# Patient Record
(demographics unavailable — no encounter records)

---

## 2024-11-22 NOTE — DISCUSSION/SUMMARY
[de-identified] : 80y Female presents with bilateral knee pain ongoing for several years.  Her right knee has been increasingly painful over the last 20 years and her left knee just in the last few. XR imaging reveals moderate patellar femoral osteoarthritis and DJD.  She tried PT 6 months ago with minimal benefit and is taking NSAIDs with increasing frequency.   Plan: - US guided CSI bilateral - Referral to PT - RTC 1 month for re-evaluation.   Will consider HA injection at that time.  HA injection ordered.

## 2024-11-22 NOTE — PROCEDURE
[de-identified] : Ultrasound Guided Injection   Indication: Ensure placement within the intra-articular knee joint utilizing the Sonosite portable ultrasound machine, the Linear 25mm 15-4 MHz transducer, sterile ultrasound gel, ultrasound guidance with the probe in short axis to the joint , utilizing an in-plane approach, was used for the following injection:    Injection: RIGHT intra-articular knee joint.  Indication: Knee osteoarthritis.  A discussion was had with the patient regarding this procedure and all questions were answered. All risks, benefits and alternatives were discussed. These included but were not limited to bleeding, infection, and allergic reaction. A timeout was performed prior to the procedure to ensure proper side.  Chlorhexidine was used to sterilize the skin overlying the knee joint.  A 21-gauge needle was used to inject 1cc of 0.5% Ropivacaine, 1cc 1% Lidocaine, 1cc of 40mg/mL Depo-Medrol into the joint from a superolateral approach. A sterile bandage was then applied. The patient tolerated the procedure well and there were no complications.  Ultrasound Guided Injection   Indication: Ensure placement within the intra-articular knee joint utilizing the Sonosite portable ultrasound machine, the Linear 25mm 15-4 MHz transducer, sterile ultrasound gel, ultrasound guidance with the probe in short axis to the joint , utilizing an in-plane approach, was used for the following injection:    Injection: LEFT intra-articular knee joint.  Indication: Knee osteoarthritis.  A discussion was had with the patient regarding this procedure and all questions were answered. All risks, benefits and alternatives were discussed. These included but were not limited to bleeding, infection, and allergic reaction. A timeout was performed prior to the procedure to ensure proper side.  Chlorhexidine was used to sterilize the skin overlying the knee joint.  A 21-gauge needle was used to inject 1cc of 0.5% Ropivacaine, 1cc 1% Lidocaine, 1cc of 40mg/mL Depo-Medrol into the joint from a superolateral approach. A sterile bandage was then applied. The patient tolerated the procedure well and there were no complications.

## 2024-11-22 NOTE — HISTORY OF PRESENT ILLNESS
[de-identified] : 80y Female presents with bilateral hip and knee pain for many years. Patient reports her right knee has been bothering her since age 38 when she used it to move a bed (pushed the bed with her knee).  Patient reports 5 years ago she tripped and fell transferring trains and hurt her left knee.  Her knee recovered from the fall but 2 years ago the pain in her left knee started. She has difficulty using stairs and usually crawls.  She crawled today [place] the subway steps.  She is taking OTC Tylenol and Advil on her worst days - last week she took it twice a day daily. She only takes them at night.  Patient states her hips hurt when she walks often, with cold weather, and when doing chores. She states her hips are more stiff than painful.  Patient has tried PT in the past  - on hormonal therapy w/ anastrazole, small molecule inhibitor with Ibrance  - She appears to have excellent functional status and notably while Ibrance can be a/w cytopenias, she has no evidence of marrow suppression on cbc and has been on her off week of her cycle  - Oncologist Dr. Juvencio Fletcher consulted, recommend resuming anastrazole, continue to hold Ibrance

## 2024-11-22 NOTE — PROCEDURE
[de-identified] : Ultrasound Guided Injection   Indication: Ensure placement within the intra-articular knee joint utilizing the Sonosite portable ultrasound machine, the Linear 25mm 15-4 MHz transducer, sterile ultrasound gel, ultrasound guidance with the probe in short axis to the joint , utilizing an in-plane approach, was used for the following injection:    Injection: RIGHT intra-articular knee joint.  Indication: Knee osteoarthritis.  A discussion was had with the patient regarding this procedure and all questions were answered. All risks, benefits and alternatives were discussed. These included but were not limited to bleeding, infection, and allergic reaction. A timeout was performed prior to the procedure to ensure proper side.  Chlorhexidine was used to sterilize the skin overlying the knee joint.  A 21-gauge needle was used to inject 1cc of 0.5% Ropivacaine, 1cc 1% Lidocaine, 1cc of 40mg/mL Depo-Medrol into the joint from a superolateral approach. A sterile bandage was then applied. The patient tolerated the procedure well and there were no complications.  Ultrasound Guided Injection   Indication: Ensure placement within the intra-articular knee joint utilizing the Sonosite portable ultrasound machine, the Linear 25mm 15-4 MHz transducer, sterile ultrasound gel, ultrasound guidance with the probe in short axis to the joint , utilizing an in-plane approach, was used for the following injection:    Injection: LEFT intra-articular knee joint.  Indication: Knee osteoarthritis.  A discussion was had with the patient regarding this procedure and all questions were answered. All risks, benefits and alternatives were discussed. These included but were not limited to bleeding, infection, and allergic reaction. A timeout was performed prior to the procedure to ensure proper side.  Chlorhexidine was used to sterilize the skin overlying the knee joint.  A 21-gauge needle was used to inject 1cc of 0.5% Ropivacaine, 1cc 1% Lidocaine, 1cc of 40mg/mL Depo-Medrol into the joint from a superolateral approach. A sterile bandage was then applied. The patient tolerated the procedure well and there were no complications.

## 2024-11-22 NOTE — HISTORY OF PRESENT ILLNESS
[de-identified] : 80y Female presents with bilateral hip and knee pain for many years. Patient reports her right knee has been bothering her since age 38 when she used it to move a bed (pushed the bed with her knee).  Patient reports 5 years ago she tripped and fell transferring trains and hurt her left knee.  Her knee recovered from the fall but 2 years ago the pain in her left knee started. She has difficulty using stairs and usually crawls.  She crawled today [place] the subway steps.  She is taking OTC Tylenol and Advil on her worst days - last week she took it twice a day daily. She only takes them at night.  Patient states her hips hurt when she walks often, with cold weather, and when doing chores. She states her hips are more stiff than painful.  Patient has tried PT in the past

## 2024-11-22 NOTE — DISCUSSION/SUMMARY
[de-identified] : 80y Female presents with bilateral knee pain ongoing for several years.  Her right knee has been increasingly painful over the last 20 years and her left knee just in the last few. XR imaging reveals moderate patellar femoral osteoarthritis and DJD.  She tried PT 6 months ago with minimal benefit and is taking NSAIDs with increasing frequency.   Plan: - US guided CSI bilateral - Referral to PT - RTC 1 month for re-evaluation.   Will consider HA injection at that time.  HA injection ordered.

## 2024-11-22 NOTE — PHYSICAL EXAM
[de-identified] :  Knee Bilateral   Inspection Skin: normal Effusion: minimal  Bursa swelling: none  Palpation Tenderness: Mild-moderate Location: left - ttp medial joint line. right- over patella.   Crepitus: none. Defect: none. Popliteal fullness: negative.  Flexion Active ROM: Difficulty with entire range of flexion of right knee.  Pain end 20degrees with left knee flexion.  Passive ROM: Decreased 20 degrees bilaterally due to pain.   Extension Normal    Straight Leg Raise- can perform Motor strength Flexion: 5/5 Extension: 5/5  Sensory index Normal.  Patellofemoral tests Patellar grind test: positive  Patellar apprehension: negative  [de-identified] : XR of bilateral knees  Date: 11/21/2024   Views: 4 views Performed at [facility]: Yes Impression: Moderate patellofemoral OA and moderate to significant medial compartment OA bilaterally left worse than right with significant joint space narrowing sclerotic changes and osteophytic formation.  These images were personally reviewed with original findings documented as above.

## 2024-11-22 NOTE — PHYSICAL EXAM
[de-identified] :  Knee Bilateral   Inspection Skin: normal Effusion: minimal  Bursa swelling: none  Palpation Tenderness: Mild-moderate Location: left - ttp medial joint line. right- over patella.   Crepitus: none. Defect: none. Popliteal fullness: negative.  Flexion Active ROM: Difficulty with entire range of flexion of right knee.  Pain end 20degrees with left knee flexion.  Passive ROM: Decreased 20 degrees bilaterally due to pain.   Extension Normal    Straight Leg Raise- can perform Motor strength Flexion: 5/5 Extension: 5/5  Sensory index Normal.  Patellofemoral tests Patellar grind test: positive  Patellar apprehension: negative  [de-identified] : XR of bilateral knees  Date: 11/21/2024   Views: 4 views Performed at [facility]: Yes Impression: Moderate patellofemoral OA and moderate to significant medial compartment OA bilaterally left worse than right with significant joint space narrowing sclerotic changes and osteophytic formation.  These images were personally reviewed with original findings documented as above.

## 2024-11-26 NOTE — PROCEDURE
[de-identified] : Ultrasound Guided Injection   Indication: Ensure placement within the intra-articular knee joint utilizing the Sonosite portable ultrasound machine, the Linear 25mm 15-4 MHz transducer, sterile ultrasound gel, ultrasound guidance with the probe in short axis to the joint , utilizing an in-plane approach, was used for the following injection:    Injection: RIGHT intra-articular knee joint.  Indication: Knee osteoarthritis.  A discussion was had with the patient regarding this procedure and all questions were answered. All risks, benefits and alternatives were discussed. These included but were not limited to bleeding, infection, and allergic reaction. A timeout was performed prior to the procedure to ensure proper side.  Chlorhexidine was used to sterilize the skin overlying the knee joint.  A 21-gauge needle was used to inject 4 cc Monovisc into the joint from a superolateral approach. A sterile bandage was then applied. The patient tolerated the procedure well and there were no complications.  Ultrasound Guided Injection   Indication: Ensure placement within the intra-articular knee joint utilizing the Sonosite portable ultrasound machine, the Linear 25mm 15-4 MHz transducer, sterile ultrasound gel, ultrasound guidance with the probe in short axis to the joint , utilizing an in-plane approach, was used for the following injection:    Injection: LEFT intra-articualr knee joint.  Indication: Knee osteoarthritis.  A discussion was had with the patient regarding this procedure and all questions were answered. All risks, benefits and alternatives were discussed. These included but were not limited to bleeding, infection, and allergic reaction. A timeout was performed prior to the procedure to ensure proper side.  Chlorhexidine was used to sterilize the skin overlying the knee joint.  A 21-gauge needle was used to inject 4 cc Monovisc into the joint from a superolateral approach. A sterile bandage was then applied. The patient tolerated the procedure well and there were no complications.  Patient will follow-up in 1 month to evaluate her hip and low back pain as well as to see if knee pain is improved.

## 2024-12-30 NOTE — PHYSICAL EXAM
[de-identified] :     Knee Bilateral  Inspection Skin: normal Effusion: minimal Bursa swelling: none  Palpation Tenderness: Mild-moderate Location: left - ttp medial joint line. right- over patella.  Crepitus: none. Defect: none. Popliteal fullness: negative.  Flexion Active ROM: Difficulty with entire range of flexion of right knee. Pain end 20degrees with left knee flexion. Passive ROM: Decreased 20 degrees bilaterally due to pain.  Extension Normal   Straight Leg Raise- can perform Motor strength Flexion: 5/5 Extension: 5/5  Sensory index Normal.  Patellofemoral tests Patellar grind test: positive Patellar apprehension: negative  Lumbar Physical Exam    Inspection: No evidence of scoliosis.    Palpation:   Tenderness: Mild Location: Centrally at the SI joint, left-sided paraspinals    ROM Flexion: normal   Extension: normal  Rotation: normal  Lateral Bending: normal     Straight leg test: Positive on the left Slump test: Negative  Sensation: Normal to light touch   Motor: 5/5 strength throughout L2-S1  Deep tendon reflexes are symmetrical, 2+ on bilateral knee, 1+ on bilateral ankles    Clonus: Negative Babinski: Negative [de-identified] : XR of lumbar spine Date: 12/30/2024   Views: 4 views Performed at [facility]: Yes Impression: Mild lumbosacral levoscoliosis, moderate spondylosis at L4-L5 L5-S1 with foraminal stenosis.  These images were personally reviewed with original findings documented as above.  XR of bilateral knees  Date: 11/21/2024   Views: 4 views Performed at [facility]: Yes Impression: Moderate patellofemoral OA and moderate to significant medial compartment OA bilaterally left worse than right with significant joint space narrowing sclerotic changes and osteophytic formation.  These images were personally reviewed with original findings documented as above.

## 2024-12-30 NOTE — DISCUSSION/SUMMARY
[de-identified] : 80y Female presents with bilateral knee pain ongoing for several years.  Her right knee has been increasingly painful over the last 20 years and her left knee just in the last few. XR imaging reveals moderate patellar femoral osteoarthritis and DJD.  She tried PT 6 months ago with minimal benefit and is taking NSAIDs with increasing frequency.   Minimal improvement in terms knee pain after CSI and HA injections.  Encouraged importance of active physical therapy at a new location with [facility] to focus on strengthening exercises.  Discussed this will likely help with the radicular back pain as well.  Plan: 1.  New PT referral given for [facility] rehab to work on knee pain and low back pain 2.  Diclofenac 75 mg to be taken twice a day as needed prescribed to help both areas of pain 3.  Patient will follow-up in 3 months if no improvement can consider directed left hip x-ray and/or low back MRI.  Also can consider evaluation for possible knee replacement surgery vs repeat CSI, or PRP injection.
